# Patient Record
Sex: FEMALE | Race: WHITE | HISPANIC OR LATINO | Employment: PART TIME | URBAN - METROPOLITAN AREA
[De-identification: names, ages, dates, MRNs, and addresses within clinical notes are randomized per-mention and may not be internally consistent; named-entity substitution may affect disease eponyms.]

---

## 2017-01-25 ENCOUNTER — NON-PROVIDER VISIT (OUTPATIENT)
Dept: OBGYN | Facility: CLINIC | Age: 27
End: 2017-01-25
Payer: MEDICAID

## 2017-01-25 DIAGNOSIS — Z32.01 PREGNANCY EXAMINATION OR TEST, POSITIVE RESULT: ICD-10-CM

## 2017-01-25 LAB
INT CON NEG: NEGATIVE
INT CON POS: POSITIVE
POC URINE PREGNANCY TEST: POSITIVE

## 2017-01-25 PROCEDURE — 81025 URINE PREGNANCY TEST: CPT | Performed by: OBSTETRICS & GYNECOLOGY

## 2017-02-27 ENCOUNTER — INITIAL PRENATAL (OUTPATIENT)
Dept: OBGYN | Facility: CLINIC | Age: 27
End: 2017-02-27
Payer: MEDICAID

## 2017-02-27 VITALS
HEIGHT: 62 IN | WEIGHT: 119 LBS | DIASTOLIC BLOOD PRESSURE: 58 MMHG | BODY MASS INDEX: 21.9 KG/M2 | SYSTOLIC BLOOD PRESSURE: 98 MMHG

## 2017-02-27 DIAGNOSIS — N93.8 DUB (DYSFUNCTIONAL UTERINE BLEEDING): ICD-10-CM

## 2017-02-27 LAB — IN CLINIC OB SCAN: NORMAL

## 2017-02-27 PROCEDURE — 99203 OFFICE O/P NEW LOW 30 MIN: CPT | Mod: 25 | Performed by: OBSTETRICS & GYNECOLOGY

## 2017-02-27 PROCEDURE — 76830 TRANSVAGINAL US NON-OB: CPT | Performed by: OBSTETRICS & GYNECOLOGY

## 2017-02-27 ASSESSMENT — ENCOUNTER SYMPTOMS
CONSTIPATION: 0
DEPRESSION: 0
DIARRHEA: 0
NECK PAIN: 0
FOCAL WEAKNESS: 0
MYALGIAS: 0
HALLUCINATIONS: 0
SHORTNESS OF BREATH: 0
PALPITATIONS: 0
PHOTOPHOBIA: 0
DOUBLE VISION: 0
BLURRED VISION: 0
SENSORY CHANGE: 0
SPUTUM PRODUCTION: 0
SPEECH CHANGE: 0
CONSTITUTIONAL NEGATIVE: 1
NERVOUS/ANXIOUS: 0
ABDOMINAL PAIN: 0

## 2017-02-27 ASSESSMENT — LIFESTYLE VARIABLES: SUBSTANCE_ABUSE: 0

## 2017-02-27 NOTE — MR AVS SNAPSHOT
"        Radha Cardenas   2017 1:30 PM   Initial Prenatal   MRN: [id number]    [facility]   Dept Phone:  [phone]    Description:  Female : 1990   Provider:  Min Gaines M.D.           Allergies as of 2017     No Known Allergies      You were diagnosed with     DUB (dysfunctional uterine bleeding)   [550592]         Vital Signs     Blood Pressure Height Weight Body Mass Index Last Menstrual Period Smoking Status    98/58 mmHg 1.575 m (5' 2\") 53.978 kg (119 lb) 21.76 kg/m2 (LMP Unknown) Never Smoker       Basic Information     Date Of Birth Sex Race Ethnicity Preferred Language    1990 Female Unable to Obtain  Origin (Romanian,Salvadorean,Hungarian,Gabriele, etc) English      Health Maintenance     Patient has no pending health maintenance at this time      Results     POCT US - In Clinic OB Scan      Component    In Clinic OB Scan                        Current Immunizations     Tuberculin Skin Test 2016      Below and/or attached are the medications your provider expects you to take. Review all of your home medications and newly ordered medications with your provider and/or pharmacist. Follow medication instructions as directed by your provider and/or pharmacist. Please keep your medication list with you and share with your provider. Update the information when medications are discontinued, doses are changed, or new medications (including over-the-counter products) are added; and carry medication information at all times in the event of emergency situations     Allergies:  No Known Allergies          Medications  Valid as of: 2017 -  3:47 PM    Generic Name Brand Name Tablet Size Instructions for use    .                 Medicines prescribed today were sent to:     [facility] #[id number] - [place] - [address] [place] [address]    Phone: [phone] Fax: [phone]    Open 24 Hours?: No      Medication " refill instructions:       If your prescription bottle indicates you have medication refills left, it is not necessary to call your provider’s office. Please contact your pharmacy and they will refill your medication.    If your prescription bottle indicates you do not have any refills left, you may request refills at any time through one of the following ways: The online [employer] system (except Urgent Care), by calling your provider’s office, or by asking your pharmacy to contact your provider’s office with a refill request. Medication refills are processed only during regular business hours and may not be available until the next business day. Your provider may request additional information or to have a follow-up visit with you prior to refilling your medication.   *Please Note: Medication refills are assigned a new Rx number when refilled electronically. Your pharmacy may indicate that no refills were authorized even though a new prescription for the same medication is available at the pharmacy. Please request the medicine by name with the pharmacy before contacting your provider for a refill.           [employer] Status: Patient Declined

## 2017-02-27 NOTE — PROGRESS NOTES
today. LMP unknown.  Not taking PNV yet.  Phone #  [phone]  Pharmacy verified with patient  C/o back pain, cramping

## 2017-02-27 NOTE — PROGRESS NOTES
"Subjective:      Radha Cardenas is a 26 y.o. female who presents with No chief complaint on file.  Pt here for    mild nausea and vommiting.  No medicla isues.  Active Ambulatory Problems     Diagnosis Date Noted   • No Active Ambulatory Problems     Resolved Ambulatory Problems     Diagnosis Date Noted   • No Resolved Ambulatory Problems     No Additional Past Medical History     Social History     Social History   • Marital Status: Unknown     Spouse Name: N/A   • Number of Children: N/A   • Years of Education: N/A     Occupational History   • Not on file.     Social History Main Topics   • Smoking status: Never Smoker    • Smokeless tobacco: Not on file   • Alcohol Use: No   • Drug Use: No   • Sexual Activity: Not on file     Other Topics Concern   • Not on file     Social History Narrative   • No narrative on file     History reviewed. No pertinent family history.  History reviewed. No pertinent past surgical history.  Review of patient's allergies indicates no known allergies.              HPI  See above.    Review of Systems   Constitutional: Negative.    HENT: Negative for congestion and nosebleeds.    Eyes: Negative for blurred vision, double vision and photophobia.   Respiratory: Negative for sputum production and shortness of breath.    Cardiovascular: Negative for chest pain and palpitations.   Gastrointestinal: Negative for abdominal pain, diarrhea and constipation.   Genitourinary: Negative for dysuria, urgency and frequency.   Musculoskeletal: Negative for myalgias and neck pain.   Skin: Negative.    Neurological: Negative for sensory change, speech change and focal weakness.   Psychiatric/Behavioral: Negative for depression, suicidal ideas, hallucinations and substance abuse. The patient is not nervous/anxious.           Objective:     BP 98/58 mmHg  Ht 1.575 m (5' 2\")  Wt 53.978 kg (119 lb)  BMI 21.76 kg/m2  LMP  (LMP Unknown)     Physical Exam   Constitutional: She is oriented to " person, place, and time. She appears well-developed and well-nourished.   HENT:   Head: Normocephalic and atraumatic.   Eyes: EOM are normal. Pupils are equal, round, and reactive to light.   Neck: Normal range of motion. Neck supple. No thyromegaly present.   Cardiovascular: Normal rate, regular rhythm and normal heart sounds.    Pulmonary/Chest: Effort normal and breath sounds normal.   Abdominal: Soft. Bowel sounds are normal.   Genitourinary: Vagina normal and uterus normal.   Musculoskeletal: Normal range of motion.   Neurological: She is alert and oriented to person, place, and time. She has normal reflexes.   Skin: Skin is warm and dry.   Psychiatric: She has a normal mood and affect. Her behavior is normal. Judgment and thought content normal.   Nursing note and vitals reviewed.    Ultrasound: First trimester findings:  Transvaginal Ultrasound.   Intrauterine gestational sac seen: yes  Gestational sac summary: fetal pole seen, yolk sac seen,    EGA:   weeks  Fetal cardiac activity: present  Crown-rump length: c/w 14  weeks   CHASTITY  -aug/     no free fluid in pelvis.                                     Assessment/Plan:      27 y/o     SLIUP  Routine prenatal care.

## 2020-11-25 ENCOUNTER — APPOINTMENT (OUTPATIENT)
Dept: LAB | Facility: MEDICAL CENTER | Age: 30
End: 2020-11-25
Payer: MEDICAID

## 2023-02-16 ENCOUNTER — HOSPITAL ENCOUNTER (EMERGENCY)
Facility: MEDICAL CENTER | Age: 33
End: 2023-02-16
Attending: EMERGENCY MEDICINE
Payer: COMMERCIAL

## 2023-02-16 VITALS
OXYGEN SATURATION: 97 % | TEMPERATURE: 98 F | RESPIRATION RATE: 13 BRPM | HEIGHT: 62 IN | DIASTOLIC BLOOD PRESSURE: 65 MMHG | WEIGHT: 125 LBS | HEART RATE: 77 BPM | BODY MASS INDEX: 23 KG/M2 | SYSTOLIC BLOOD PRESSURE: 108 MMHG

## 2023-02-16 DIAGNOSIS — T78.40XA ALLERGIC REACTION, INITIAL ENCOUNTER: ICD-10-CM

## 2023-02-16 DIAGNOSIS — L50.9 URTICARIA: ICD-10-CM

## 2023-02-16 LAB
ALBUMIN SERPL BCP-MCNC: 3.9 G/DL (ref 3.2–4.9)
ALBUMIN/GLOB SERPL: 1.3 G/DL
ALP SERPL-CCNC: 82 U/L (ref 30–99)
ALT SERPL-CCNC: 13 U/L (ref 2–50)
ANION GAP SERPL CALC-SCNC: 13 MMOL/L (ref 7–16)
AST SERPL-CCNC: 22 U/L (ref 12–45)
BASOPHILS # BLD AUTO: 0.8 % (ref 0–1.8)
BASOPHILS # BLD: 0.06 K/UL (ref 0–0.12)
BILIRUB SERPL-MCNC: 0.6 MG/DL (ref 0.1–1.5)
BUN SERPL-MCNC: 14 MG/DL (ref 8–22)
CALCIUM ALBUM COR SERPL-MCNC: 9.4 MG/DL (ref 8.5–10.5)
CALCIUM SERPL-MCNC: 9.3 MG/DL (ref 8.5–10.5)
CHLORIDE SERPL-SCNC: 104 MMOL/L (ref 96–112)
CO2 SERPL-SCNC: 20 MMOL/L (ref 20–33)
CREAT SERPL-MCNC: 0.64 MG/DL (ref 0.5–1.4)
EOSINOPHIL # BLD AUTO: 0.09 K/UL (ref 0–0.51)
EOSINOPHIL NFR BLD: 1.2 % (ref 0–6.9)
ERYTHROCYTE [DISTWIDTH] IN BLOOD BY AUTOMATED COUNT: 39.1 FL (ref 35.9–50)
FLUAV RNA SPEC QL NAA+PROBE: NEGATIVE
FLUBV RNA SPEC QL NAA+PROBE: NEGATIVE
GFR SERPLBLD CREATININE-BSD FMLA CKD-EPI: 119 ML/MIN/1.73 M 2
GLOBULIN SER CALC-MCNC: 3 G/DL (ref 1.9–3.5)
GLUCOSE SERPL-MCNC: 126 MG/DL (ref 65–99)
HCG SERPL QL: NEGATIVE
HCT VFR BLD AUTO: 42.3 % (ref 37–47)
HGB BLD-MCNC: 14.8 G/DL (ref 12–16)
IMM GRANULOCYTES # BLD AUTO: 0.02 K/UL (ref 0–0.11)
IMM GRANULOCYTES NFR BLD AUTO: 0.3 % (ref 0–0.9)
LYMPHOCYTES # BLD AUTO: 3.38 K/UL (ref 1–4.8)
LYMPHOCYTES NFR BLD: 45.1 % (ref 22–41)
MCH RBC QN AUTO: 31.2 PG (ref 27–33)
MCHC RBC AUTO-ENTMCNC: 35 G/DL (ref 33.6–35)
MCV RBC AUTO: 89.2 FL (ref 81.4–97.8)
MONOCYTES # BLD AUTO: 0.78 K/UL (ref 0–0.85)
MONOCYTES NFR BLD AUTO: 10.4 % (ref 0–13.4)
NEUTROPHILS # BLD AUTO: 3.16 K/UL (ref 2–7.15)
NEUTROPHILS NFR BLD: 42.2 % (ref 44–72)
NRBC # BLD AUTO: 0 K/UL
NRBC BLD-RTO: 0 /100 WBC
PLATELET # BLD AUTO: 233 K/UL (ref 164–446)
PMV BLD AUTO: 9.8 FL (ref 9–12.9)
POTASSIUM SERPL-SCNC: 3.5 MMOL/L (ref 3.6–5.5)
PROT SERPL-MCNC: 6.9 G/DL (ref 6–8.2)
RBC # BLD AUTO: 4.74 M/UL (ref 4.2–5.4)
RSV RNA SPEC QL NAA+PROBE: NEGATIVE
SARS-COV-2 RNA RESP QL NAA+PROBE: NOTDETECTED
SODIUM SERPL-SCNC: 137 MMOL/L (ref 135–145)
SPECIMEN SOURCE: NORMAL
WBC # BLD AUTO: 7.5 K/UL (ref 4.8–10.8)

## 2023-02-16 PROCEDURE — 700105 HCHG RX REV CODE 258: Performed by: EMERGENCY MEDICINE

## 2023-02-16 PROCEDURE — 0241U HCHG SARS-COV-2 COVID-19 NFCT DS RESP RNA 4 TRGT MIC: CPT

## 2023-02-16 PROCEDURE — 96375 TX/PRO/DX INJ NEW DRUG ADDON: CPT

## 2023-02-16 PROCEDURE — 99285 EMERGENCY DEPT VISIT HI MDM: CPT

## 2023-02-16 PROCEDURE — 80053 COMPREHEN METABOLIC PANEL: CPT

## 2023-02-16 PROCEDURE — C9803 HOPD COVID-19 SPEC COLLECT: HCPCS | Performed by: EMERGENCY MEDICINE

## 2023-02-16 PROCEDURE — 96374 THER/PROPH/DIAG INJ IV PUSH: CPT

## 2023-02-16 PROCEDURE — 85025 COMPLETE CBC W/AUTO DIFF WBC: CPT

## 2023-02-16 PROCEDURE — 84703 CHORIONIC GONADOTROPIN ASSAY: CPT

## 2023-02-16 PROCEDURE — 700111 HCHG RX REV CODE 636 W/ 250 OVERRIDE (IP): Performed by: EMERGENCY MEDICINE

## 2023-02-16 PROCEDURE — 36415 COLL VENOUS BLD VENIPUNCTURE: CPT

## 2023-02-16 RX ORDER — METHYLPREDNISOLONE SODIUM SUCCINATE 125 MG/2ML
125 INJECTION, POWDER, LYOPHILIZED, FOR SOLUTION INTRAMUSCULAR; INTRAVENOUS ONCE
Status: COMPLETED | OUTPATIENT
Start: 2023-02-16 | End: 2023-02-16

## 2023-02-16 RX ORDER — SODIUM CHLORIDE 9 MG/ML
1000 INJECTION, SOLUTION INTRAVENOUS ONCE
Status: COMPLETED | OUTPATIENT
Start: 2023-02-16 | End: 2023-02-16

## 2023-02-16 RX ORDER — PREDNISONE 20 MG/1
60 TABLET ORAL DAILY
Qty: 30 TABLET | Refills: 0 | Status: SHIPPED | OUTPATIENT
Start: 2023-02-16 | End: 2023-02-26

## 2023-02-16 RX ADMIN — SODIUM CHLORIDE 1000 ML: 9 INJECTION, SOLUTION INTRAVENOUS at 14:56

## 2023-02-16 RX ADMIN — FAMOTIDINE 20 MG: 10 INJECTION, SOLUTION INTRAVENOUS at 14:59

## 2023-02-16 RX ADMIN — METHYLPREDNISOLONE SODIUM SUCCINATE 125 MG: 125 INJECTION, POWDER, FOR SOLUTION INTRAMUSCULAR; INTRAVENOUS at 14:59

## 2023-02-16 NOTE — Clinical Note
Radha Cardenas was seen and treated in our emergency department on 2/16/2023.  Radha Cardenas may return to work on 02/21/2023.       If you have any questions or concerns, please don't hesitate to call.      Fior Castañeda M.D.

## 2023-02-16 NOTE — ED NOTES
Pt able to ambulate from GRN 26 tp the restroom without increased WOB. Pt placed back on cardiac monitor, GCS 15.

## 2023-02-16 NOTE — ED PROVIDER NOTES
ER Provider Note    Scribed for Fior Castañeda M.d. by Terrence Mars. 2/16/2023  2:30 PM    Primary Care Provider: No primary care provider on file.    CHIEF COMPLAINT  Chief Complaint   Patient presents with    Allergic Reaction     Pt was eatting approx 30 min PTA when she began to experience a rash and felt like her throat was closing up. EMS was called and gave 50mg of Benadryl PTA. Pt with swelling and rash noted over entire body.      EXTERNAL RECORDS REVIEWED  No records shown.    HPI/ROS  LIMITATION TO HISTORY   Select: : None  OUTSIDE HISTORIAN(S):  Parent      Radha Cardenas is a 32 y.o. adult who presents to the ED via EMS complaining of allergic reaction onset 30 minutes ago. Patient states she ate rice and chili 30 minutes ago. She notes she ate rice and chili last night as well. She states she shortly began developing rashes and experiencing nausea, facial swelling, shortness of breath, and blurred vision. She was given 50 mg Benadryl in transit. Patient reports her symptoms have since improved. She denies any mouth swelling, sore throat, or runny nose. Patient reports she took Aleve 9 am. She notes she vomited last night and felt she may be developing a cold. She denies any known food allergies. She denies using any new soaps or detergents. She denies any possibility of pregnancy. Patient parents, who she lives with, are experiencing cold-like symptoms.    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 No pertinent surgical history.    FAMILY HISTORY  History reviewed. No pertinent family history.    SOCIAL HISTORY   reports that she has never smoked. She has never used smokeless tobacco. She reports that she does not drink alcohol and does not use drugs.    CURRENT MEDICATIONS  Discharge Medication List as of 2/16/2023  5:15 PM          ALLERGIES  Patient has no known  "allergies.    PHYSICAL EXAM  BP 93/57   Pulse 77   Temp 36.2 °C (97.1 °F) (Temporal)   Resp 18   Ht 1.575 m (5' 2\")   Wt 56.7 kg (125 lb)   SpO2 95%   BMI 22.86 kg/m²   Constitutional: Nontoxic appearing. Alert in no apparent distress.  HENT: No oral swelling. Normocephalic, Atraumatic. Bilateral external ears normal. Nose normal.  Moist mucous membranes.  Oropharynx clear.  Eyes: Pupils are equal and reactive. Conjunctiva normal.   Neck: Supple, full range of motion  Heart: Regular rate and rhythm.  No murmurs.    Lungs: No respiratory distress, normal work of breathing. Lungs clear to auscultation bilaterally. No wheezing  Abdomen Soft, no distention.  No tenderness to palpation.  Musculoskeletal: Atraumatic. No obvious deformities noted.  No lower extremity edema.  Skin: Diffuse erythematous rash. Mild periorbital swelling. Swelling to bilateral hands.   Neurologic: Alert and oriented x3. Moving all extremities spontaneously without focal deficits.  Psychiatric: Affect normal, Mood normal, Appears appropriate and not intoxicated.      DIAGNOSTIC STUDIES    Labs:   Labs Reviewed   CBC WITH DIFFERENTIAL - Abnormal; Notable for the following components:       Result Value    Neutrophils-Polys 42.20 (*)     Lymphocytes 45.10 (*)     All other components within normal limits   COMP METABOLIC PANEL - Abnormal; Notable for the following components:    Potassium 3.5 (*)     Glucose 126 (*)     All other components within normal limits   HCG QUAL SERUM   COV-2, FLU A/B, AND RSV BY PCR (CEPHEID)   CORRECTED CALCIUM   ESTIMATED GFR        COURSE & MEDICAL DECISION MAKING     2:37 PM - Patient was evaluated at bedside. Patient and/or family verbalizes understanding to the plan of care.     ED Observation Status? Yes; I am placing the patient in to an observation status due to a diagnostic uncertainty as well as therapeutic intensity. Patient placed in observation status at 2:37 PM, 2/16/2023.     Observation plan is " as follows: Ordered Viral PCR, CBC w/ diff, CMP, Qual Serum HCG to evaluate. Patient will be treated with famotidine 20 mg, Solu-Medrol 125 mg, 1 L NS.     Upon Reevaluation, the patient's condition has: Improved; and will be discharged.    Patient discharged from ED Observation status at 4:20 PM (Time) 2/16/23 (Date).     INITIAL ASSESSMENT, COURSE AND PLAN  Care Narrative: Young healthy female presents following allergic reaction. She had some concerning symptoms for anaphylaxis however her symptoms seem to have improved with benadryl.  She has borderline low blood pressure on arrival however otherwise normal vitals.  She has mild periorbital swelling however no oral swelling.  No respiratory compromise or wheezing.  Considered IM epinephrine however symptoms seem to be improving just with benadryl.  Given solumedrol and famotidine.  Labs reassuring without leukocytosis, electrolyte abnormality.  Viral testing negative.    4:20 PM - Upon reassessment, patient is resting comfortably with normal vital signs. She is feeling significantly improved. No new complaints at this time.  Discussed results with patient and/or family as well as importance of primary care/allergist follow up. Will discharge home with epipen and instructions on use. Patient understands plan of care and strict return precautions for new or changing symptoms.      ADDITIONAL PROBLEM LIST  Problem #1:  Allergic reaction - improved with benadryl, steroids, famotidine.  Will discharge home with epipen as well as short course of steroids and OTC antihistamines.  Given allergy information for follow up.    DISPOSITION AND DISCUSSIONS  Escalation of care considered, and ultimately not performed: IM epinephrine    Barriers to care at this time, including but not limited to: Patient does not have established PCP.         DISPOSITION:  Patient will be discharged home in stable condition.    FOLLOW UP:  [facility]  [address]  [place]  [facility] [id number] [phone]  Call   to establish primary care physician    Tone Allred M.D.  [address] [facility]  [address]  [facility] [id number] [phone]    Schedule an appointment as soon as possible for a visit   allergist follow up    [facility], Emergency Dept  [address] [facility] [address] [phone]    If symptoms worsen      OUTPATIENT MEDICATIONS:  Discharge Medication List as of 2/16/2023  5:15 PM        START taking these medications    Details   EPINEPHrine 0.3 MG/0.3ML Solution Prefilled Syringe Inject the contents of the epipen into the thigh, hold for 3 seconds and release from thigh as needed for anaphylaxis., Disp-1 Each, R-0, Normal      predniSONE (DELTASONE) 20 MG Tab Take 3 Tablets by mouth every day for 10 days., Disp-30 Tablet, R-0, Normal              FINAL DIAGNOSIS  1. Allergic reaction, initial encounter    2. Urticaria          ITerrence (Saudibe), am scribing for, and in the presence of, Fior Castañeda M.D..    Electronically signed by: Terrence Mars (Saudibe), 2/16/2023    IFior M.D. personally performed the services described in this documentation, as scribed by Terrence Mars in my presence, and it is both accurate and complete.     The note accurately reflects work and decisions made by me.  Fior Castañeda M.D.  2/17/2023  3:13 PM

## 2023-02-16 NOTE — ED TRIAGE NOTES
"Chief Complaint   Patient presents with    Allergic Reaction     Pt was eatting approx 30 min PTA when she began to experience a rash and felt like her throat was closing up. EMS was called and gave 50mg of Benadryl PTA. Pt with swelling and rash noted over entire body.    BP (P) 93/57   Pulse (P) 77   Temp (P) 36.2 °C (97.1 °F) (Temporal)   Resp (P) 18   Ht (P) 1.575 m (5' 2\")   Wt (P) 56.7 kg (125 lb)   SpO2 (P) 95%   BMI (P) 22.86 kg/m²     Pt brought in by EMS for above, pt noted to be slightly hypotensive by EMS, given 200ml PTA with current BP=98/59.  "

## 2023-02-17 NOTE — DISCHARGE INSTRUCTIONS
You were seen in the Emergency Department for allergic reaction.    Labs and viral testing were completed without significant acute abnormalities.    Please use 1,000mg of tylenol or 600mg of ibuprofen every 6 hours as needed for pain.    Please take steroids as directed for the next few days in addition to either over-the-counter antihistamine such as Claritin or Zyrtec or Benadryl.    Please follow up with your primary care physician and allergist.    Return to the Emergency Department with increasing oral swelling, trouble breathing, vomiting, or other concerns.

## 2023-02-17 NOTE — ED NOTES
Blankets provided to pt, pt able to ambulate to bathroom, GCS 15 no complaints at this time.     Pt states her rash has improved and denies itching at this time.

## 2023-02-17 NOTE — ED NOTES
Pt discharged to home. Pt was given follow up instructions and prescriptions for epinephrine and prednisone. Pt verbalized understanding of all instructions for discharge and is ambulatory out of ED with steady gait. AOx4